# Patient Record
Sex: MALE | Race: WHITE | NOT HISPANIC OR LATINO | Employment: FULL TIME | URBAN - METROPOLITAN AREA
[De-identification: names, ages, dates, MRNs, and addresses within clinical notes are randomized per-mention and may not be internally consistent; named-entity substitution may affect disease eponyms.]

---

## 2020-07-13 ENCOUNTER — OFFICE VISIT (OUTPATIENT)
Dept: UROLOGY | Facility: CLINIC | Age: 40
End: 2020-07-13
Payer: COMMERCIAL

## 2020-07-13 VITALS
HEART RATE: 71 BPM | DIASTOLIC BLOOD PRESSURE: 80 MMHG | WEIGHT: 290 LBS | SYSTOLIC BLOOD PRESSURE: 125 MMHG | HEIGHT: 72 IN | BODY MASS INDEX: 39.28 KG/M2

## 2020-07-13 DIAGNOSIS — Z30.2 STERILIZATION: Primary | ICD-10-CM

## 2020-07-13 PROCEDURE — 99204 OFFICE O/P NEW MOD 45 MIN: CPT | Mod: S$GLB,,, | Performed by: UROLOGY

## 2020-07-13 PROCEDURE — 3008F PR BODY MASS INDEX (BMI) DOCUMENTED: ICD-10-PCS | Mod: CPTII,S$GLB,, | Performed by: UROLOGY

## 2020-07-13 PROCEDURE — 3008F BODY MASS INDEX DOCD: CPT | Mod: CPTII,S$GLB,, | Performed by: UROLOGY

## 2020-07-13 PROCEDURE — 99204 PR OFFICE/OUTPT VISIT, NEW, LEVL IV, 45-59 MIN: ICD-10-PCS | Mod: S$GLB,,, | Performed by: UROLOGY

## 2020-07-13 RX ORDER — OMEPRAZOLE 40 MG/1
CAPSULE, DELAYED RELEASE ORAL
COMMUNITY
Start: 2020-05-30

## 2020-07-13 RX ORDER — HYDROCHLOROTHIAZIDE 12.5 MG/1
TABLET ORAL
COMMUNITY
Start: 2020-06-28

## 2020-07-13 RX ORDER — AMLODIPINE BESYLATE 10 MG/1
TABLET ORAL
COMMUNITY
Start: 2020-06-11

## 2020-07-13 RX ORDER — CANDESARTAN 32 MG/1
TABLET ORAL
COMMUNITY
Start: 2020-06-29

## 2020-07-13 RX ORDER — METOPROLOL SUCCINATE 100 MG/1
TABLET, EXTENDED RELEASE ORAL
COMMUNITY
Start: 2020-04-29

## 2020-07-13 RX ORDER — FLUOXETINE 10 MG/1
CAPSULE ORAL
COMMUNITY
Start: 2020-05-06

## 2020-07-13 RX ORDER — POTASSIUM CHLORIDE 750 MG/1
TABLET, FILM COATED, EXTENDED RELEASE ORAL
COMMUNITY
Start: 2020-06-11

## 2020-07-13 NOTE — PROGRESS NOTES
Subjective:       Patient ID: Ashwin Hutson is a 40 y.o. male.    Chief Complaint: Other (vas consult )      HPI: 41 yo male new pt two children desires a vasectomy.  Pt does not want his wife to undergo general anesthesia nor does he want her on birth control and he does not want to use condoms the rest of his life.       Past Medical History:   Past Medical History:   Diagnosis Date    Acid reflux     Hypertension        Past Surgical Historical: History reviewed. No pertinent surgical history.     Medications:   Medication List with Changes/Refills   Current Medications    AMLODIPINE (NORVASC) 10 MG TABLET        CANDESARTAN (ATACAND) 32 MG TABLET        FLUOXETINE 10 MG CAPSULE        HYDROCHLOROTHIAZIDE (HYDRODIURIL) 12.5 MG TAB        KLOR-CON 10 10 MEQ TBSR        METOPROLOL SUCCINATE (TOPROL-XL) 100 MG 24 HR TABLET        OMEPRAZOLE (PRILOSEC) 40 MG CAPSULE            Past Social History:   Social History     Socioeconomic History    Marital status:      Spouse name: Not on file    Number of children: Not on file    Years of education: Not on file    Highest education level: Not on file   Occupational History    Not on file   Social Needs    Financial resource strain: Not on file    Food insecurity     Worry: Not on file     Inability: Not on file    Transportation needs     Medical: Not on file     Non-medical: Not on file   Tobacco Use    Smoking status: Never Smoker    Smokeless tobacco: Current User   Substance and Sexual Activity    Alcohol use: Not Currently    Drug use: Never    Sexual activity: Not on file   Lifestyle    Physical activity     Days per week: Not on file     Minutes per session: Not on file    Stress: Not on file   Relationships    Social connections     Talks on phone: Not on file     Gets together: Not on file     Attends [employer] service: Not on file     Active member of club or organization: Not on file     Attends meetings of clubs or organizations: Not  on file     Relationship status: Not on file   Other Topics Concern    Not on file   Social History Narrative    Not on file       Allergies: Review of patient's allergies indicates:  No Known Allergies     Family History:   Family History   Problem Relation Age of Onset    Heart disease Father     Heart disease Mother     Hypertension Mother         Review of Systems:  Review of Systems   Constitutional: Negative for activity change and appetite change.   HENT: Negative for congestion and dental problem.    Respiratory: Negative for chest tightness and shortness of breath.    Cardiovascular: Negative for chest pain.   Gastrointestinal: Negative for abdominal distention and abdominal pain.   Genitourinary: Negative for decreased urine volume, difficulty urinating, discharge, dysuria, enuresis, flank pain, frequency, genital sores, hematuria, penile pain, penile swelling, scrotal swelling, testicular pain and urgency.   Musculoskeletal: Negative for back pain and neck pain.   Neurological: Negative for dizziness.   Hematological: Negative for adenopathy.   Psychiatric/Behavioral: Negative for agitation, behavioral problems and confusion.       Physical Exam:  Physical Exam   Nursing note and vitals reviewed.  Constitutional: He is oriented to person, place, and time. He appears well-developed.   HENT:   Head: Normocephalic.   Cardiovascular: Normal rate, regular rhythm and normal heart sounds.    Pulmonary/Chest: Effort normal and breath sounds normal.   Abdominal: Soft. Bowel sounds are normal.   Genitourinary:         Neurological: He is alert and oriented to person, place, and time.   Skin: Skin is warm and dry.         Assessment/Plan:     undesired fertility--plan bilateral vasectomy  Problem List Items Addressed This Visit     None

## 2020-07-14 ENCOUNTER — CLINICAL SUPPORT (OUTPATIENT)
Dept: UROLOGY | Facility: CLINIC | Age: 40
End: 2020-07-14
Payer: COMMERCIAL

## 2020-07-14 DIAGNOSIS — N46.9 MALE STERILITY: Primary | ICD-10-CM

## 2020-07-27 ENCOUNTER — PROCEDURE VISIT (OUTPATIENT)
Dept: UROLOGY | Facility: CLINIC | Age: 40
End: 2020-07-27
Payer: COMMERCIAL

## 2020-07-27 VITALS
OXYGEN SATURATION: 98 % | BODY MASS INDEX: 40.36 KG/M2 | SYSTOLIC BLOOD PRESSURE: 143 MMHG | HEART RATE: 62 BPM | RESPIRATION RATE: 18 BRPM | WEIGHT: 298 LBS | DIASTOLIC BLOOD PRESSURE: 77 MMHG | HEIGHT: 72 IN

## 2020-07-27 DIAGNOSIS — Z30.2 STERILIZATION: Primary | ICD-10-CM

## 2020-07-27 PROCEDURE — 55250 REMOVAL OF SPERM DUCT(S): CPT | Mod: S$GLB,,, | Performed by: UROLOGY

## 2020-07-27 PROCEDURE — 96372 PR INJECTION,THERAP/PROPH/DIAG2ST, IM OR SUBCUT: ICD-10-PCS | Mod: 59,S$GLB,, | Performed by: UROLOGY

## 2020-07-27 PROCEDURE — 55250 VASECTOMY: ICD-10-PCS | Mod: S$GLB,,, | Performed by: UROLOGY

## 2020-07-27 PROCEDURE — 96372 THER/PROPH/DIAG INJ SC/IM: CPT | Mod: 59,S$GLB,, | Performed by: UROLOGY

## 2020-07-27 RX ORDER — PROMETHAZINE HYDROCHLORIDE 25 MG/ML
50 INJECTION, SOLUTION INTRAMUSCULAR; INTRAVENOUS
Status: COMPLETED | OUTPATIENT
Start: 2020-07-27 | End: 2020-07-27

## 2020-07-27 RX ORDER — MEPERIDINE HYDROCHLORIDE 25 MG/ML
50 INJECTION INTRAMUSCULAR; INTRAVENOUS; SUBCUTANEOUS
Status: COMPLETED | OUTPATIENT
Start: 2020-07-27 | End: 2020-07-27

## 2020-07-27 RX ORDER — DIAZEPAM 10 MG/1
10 TABLET ORAL
Status: COMPLETED | OUTPATIENT
Start: 2020-07-27 | End: 2020-07-27

## 2020-07-27 RX ADMIN — DIAZEPAM 10 MG: 10 TABLET ORAL at 12:07

## 2020-07-27 RX ADMIN — PROMETHAZINE HYDROCHLORIDE 50 MG: 25 INJECTION, SOLUTION INTRAMUSCULAR; INTRAVENOUS at 12:07

## 2020-07-27 RX ADMIN — MEPERIDINE HYDROCHLORIDE 50 MG: 25 INJECTION INTRAMUSCULAR; INTRAVENOUS; SUBCUTANEOUS at 12:07

## 2020-07-27 NOTE — PROCEDURES
"Vasectomy    Date/Time: 7/27/2020 1:30 PM  Performed by: Raulito Castano MD  Authorized by: Raulito Castano MD     Consent Done?:  Yes (Written)  Time out: Immediately prior to procedure a "time out" was called to verify the correct patient, procedure, equipment, support staff and site/side marked as required.    Indications:  [place] male  Position:  Other (supine)  Anesthesia:  Lidocaine jelly  Patient sedated: Yes    Sedation:  Other (10mg valium and 100mg IM demerol)  Preparation: Patient was prepped and draped in usual sterile fashion    Incisions:  2  Length vas excised:  2.5 cm  Vas:  Fulgurated, Clipped and Buried  Sutures:  3-0 chromic SH  Skin closures:  3-0 chromic SH  Same procedure performed on both sides    Patient tolerance:  Patient tolerated the procedure well with no immediate complications     Patient sent home with pain medication.  Follow up in 1-2 weeks.  No unprotected intercourse until follow up.       "

## 2020-07-27 NOTE — PATIENT INSTRUCTIONS
What to Expect After a Vasectomy  You cannot drive or operate heavy machinery on the day of the procedure.    Apply ice packs to the scrotal area for 24-48 hours. Avoid direct contact of the ice pack with the skin. Scrotal supports, jock straps, or fitted underwear help elevate the scrotum and reduce discomfort.    You may shower the next day. Gently apply soapy water to the scrotum to wash. Rinse and dry yourself by blotting the skin, not rubbing.    Avoid strenuous physical exercises or sexual relations for at least one week after a vasectomy.    Continue to use birth control for at least 6 weeks or 10-20 ejaculations. You are still considered fertile until your urologist examines a post-vasectomy semen analysis at 6 weeks and perhaps one at 8 weeks as well. Drop off the specimen at the 4th floor [facility] between 8am and 4pm.    Do NOT resume unprotected sexual activity until your physician finds no sperm in your semen.    All stitches will dissolve on their own in 1-2 weeks.    Signs and Symptoms to Report  · A large amount of bleeding at the site  · An unusual amount of pain  · A large amount of swelling in the scrotum  · Fever and chills  · Any signs of infection, such as redness at the site or foul-smelling drainage    Risks  The risks of complication after vasectomy are very low. A few of the risks include:  · Bleeding  · Infection  · Scrotal hematoma - a collection of blood in the scrotum  · Inflammation of the epididymis - inflammation of a structure next to the testicle that helps in maturation of sperm  · Sperm granuloma - a collection of sperm that leaks out from the vas deferens, forming a small nodule or lump. This does not usually cause any discomfort but you may feel it in the scrotum.  · Recanalization - the restoration of the lumen or transport tube between the two ends of the vas deferens, possibly causing fertility    If you have any questions or concerns, please call your [facility]  urologist at [phone].  NO STRENUOUS ACTIVITY FOR 7 DAYS/NO LIFTING ANYTHING OVER 10LBS  NO SEXUAL INTERCOURSE FOR 7-14 DAYS

## 2020-08-03 ENCOUNTER — OFFICE VISIT (OUTPATIENT)
Dept: UROLOGY | Facility: CLINIC | Age: 40
End: 2020-08-03
Payer: COMMERCIAL

## 2020-08-03 DIAGNOSIS — N46.9 MALE STERILITY: Primary | ICD-10-CM

## 2020-08-03 PROCEDURE — 99024 PR POST-OP FOLLOW-UP VISIT: ICD-10-PCS | Mod: S$GLB,,, | Performed by: NURSE PRACTITIONER

## 2020-08-03 PROCEDURE — 99024 POSTOP FOLLOW-UP VISIT: CPT | Mod: S$GLB,,, | Performed by: NURSE PRACTITIONER

## 2020-08-03 NOTE — PROGRESS NOTES
Subjective:       Patient ID: Ashwin Hutson is a 40 y.o. male.    Chief Complaint: Vas f/u      HPI: [age] year male known service Dr. Castano status post vasectomy.  Healing well, no pain.  No complaints       Past Medical History:   Past Medical History:   Diagnosis Date    Acid reflux     Hypertension        Past Surgical Historical:   Past Surgical History:   Procedure Laterality Date    VASECTOMY  07/27/2020        Medications:   Medication List with Changes/Refills   Current Medications    AMLODIPINE (NORVASC) 10 MG TABLET        CANDESARTAN (ATACAND) 32 MG TABLET        FLUOXETINE 10 MG CAPSULE        HYDROCHLOROTHIAZIDE (HYDRODIURIL) 12.5 MG TAB        KLOR-CON 10 10 MEQ TBSR        METOPROLOL SUCCINATE (TOPROL-XL) 100 MG 24 HR TABLET        OMEPRAZOLE (PRILOSEC) 40 MG CAPSULE            Past Social History:   Social History     Socioeconomic History    Marital status:      Spouse name: Not on file    Number of children: Not on file    Years of education: Not on file    Highest education level: Not on file   Occupational History    Not on file   Social Needs    Financial resource strain: Not on file    Food insecurity     Worry: Not on file     Inability: Not on file    Transportation needs     Medical: Not on file     Non-medical: Not on file   Tobacco Use    Smoking status: Never Smoker    Smokeless tobacco: Current User   Substance and Sexual Activity    Alcohol use: Not Currently    Drug use: Never    Sexual activity: Not on file   Lifestyle    Physical activity     Days per week: Not on file     Minutes per session: Not on file    Stress: Not on file   Relationships    Social connections     Talks on phone: Not on file     Gets together: Not on file     Attends [employer] service: Not on file     Active member of club or organization: Not on file     Attends meetings of clubs or organizations: Not on file     Relationship status: Not on file   Other Topics Concern    Not on  file   Social History Narrative    Not on file       Allergies: Review of patient's allergies indicates:  No Known Allergies     Family History:   Family History   Problem Relation Age of Onset    Heart disease Father     Heart disease Mother     Hypertension Mother         Review of Systems:  Review of Systems   Constitutional: Negative for fever and unexpected weight change.   HENT: Negative for facial swelling and trouble swallowing.    Eyes: Negative for pain and visual disturbance.   Respiratory: Negative for chest tightness, shortness of breath and wheezing.    Cardiovascular: Negative for leg swelling.   Gastrointestinal: Negative for abdominal distention, abdominal pain, anal bleeding, blood in stool and rectal pain.   Genitourinary: Negative for decreased urine volume, difficulty urinating, dysuria, enuresis, flank pain, frequency, hematuria and urgency.   Musculoskeletal: Negative for back pain.   Skin: Negative for color change.   Neurological: Negative for dizziness, seizures, syncope and weakness.   Psychiatric/Behavioral: Negative for suicidal ideas.       Physical Exam:  Physical Exam   Constitutional: He is oriented to person, place, and time. He appears well-developed.   HENT:   Head: Normocephalic.   Eyes: No scleral icterus.   Neck: Normal range of motion.   Pulmonary/Chest: Effort normal and breath sounds normal.   Abdominal: Soft. He exhibits no distension. There is no abdominal tenderness. No hernia. Hernia confirmed negative in the right inguinal area and confirmed negative in the left inguinal area.   Genitourinary:    Testes and penis normal.   Cremasteric reflex is present.          Genitourinary Comments: Positive suture breakdown bilateral incisions sites.  Bilateral  sites are dry with no exudate, no obvious signs of infection.     Neurological: He is alert and oriented to person, place, and time.   Skin: Skin is warm and dry.         Assessment/Plan:   Status post  vasectomy--continue contraception pending 2 negative semen analysis results.  Shower only no tub bath to completely healed.  Patient verbalized understanding of same  Problem List Items Addressed This Visit     None      Visit Diagnoses     Male sterility    -  Primary    Relevant Orders    Sperm Count, Post Vasectomy    Sperm Count, Post Vasectomy

## 2020-08-17 ENCOUNTER — TELEPHONE (OUTPATIENT)
Dept: UROLOGY | Facility: CLINIC | Age: 40
End: 2020-08-17

## 2020-08-17 NOTE — TELEPHONE ENCOUNTER
"SEMEN ANALYSIS (#1) 08-14-20  (post vas)    Per RC:    - "Notify continue contraception"   - "Purge x15 prior to next submission."  "

## 2020-11-16 ENCOUNTER — TELEPHONE (OUTPATIENT)
Dept: UROLOGY | Facility: CLINIC | Age: 40
End: 2020-11-16

## 2020-11-16 DIAGNOSIS — Z30.2 STERILIZATION: Primary | ICD-10-CM

## 2020-11-16 LAB
ABSTINENCE: 5 DAYS (ref 3–7)
Lab: NORMAL
MICROSCOPIC EXAM: NORMAL
RECEIVED WITHIN 1 HOUR: YES
SPERM SEEN: NORMAL SPERM/HPF
TIME DELIVERED: NORMAL
VOLUME: 5 ML

## 2020-11-16 NOTE — TELEPHONE ENCOUNTER
----- Message from Forest Mahoney NP sent at 11/16/2020  2:08 PM CST -----  Please call the patient regarding his abnormal result. notify no sperm seen. Post vasectomy. If this is 1st specimen post vas, cont. Contraception and submit 2nd

## 2020-12-03 ENCOUNTER — TELEPHONE (OUTPATIENT)
Dept: UROLOGY | Facility: CLINIC | Age: 40
End: 2020-12-03

## 2020-12-03 LAB
ABSTINENCE: 4 DAYS (ref 3–7)
Lab: NORMAL
MICROSCOPIC EXAM: NORMAL
RECEIVED WITHIN 1 HOUR: YES
SPERM SEEN: NORMAL SPERM/HPF
TIME DELIVERED: NORMAL
VOLUME: 4.5 ML

## 2020-12-03 NOTE — TELEPHONE ENCOUNTER
----- Message from Forest Mahoney NP sent at 12/3/2020  2:16 PM CST -----  Please call the patient regarding his abnormal result.  Notify no sperm seen if this is his 2nd negative he can proceed without contraception here forward.  Otherwise continue contraception pending 2-